# Patient Record
Sex: MALE | Race: OTHER | NOT HISPANIC OR LATINO | Employment: FULL TIME | URBAN - METROPOLITAN AREA
[De-identification: names, ages, dates, MRNs, and addresses within clinical notes are randomized per-mention and may not be internally consistent; named-entity substitution may affect disease eponyms.]

---

## 2024-11-12 ENCOUNTER — TRANSCRIBE ORDERS (OUTPATIENT)
Dept: ADMINISTRATIVE | Facility: HOSPITAL | Age: 59
End: 2024-11-12
Payer: COMMERCIAL

## 2024-11-12 DIAGNOSIS — Z00.01 ENCOUNTER FOR GENERAL ADULT MEDICAL EXAMINATION WITH ABNORMAL FINDINGS: Primary | ICD-10-CM

## 2025-03-04 ENCOUNTER — APPOINTMENT (OUTPATIENT)
Dept: CT IMAGING | Facility: HOSPITAL | Age: 60
End: 2025-03-04
Payer: COMMERCIAL

## 2025-03-04 ENCOUNTER — HOSPITAL ENCOUNTER (EMERGENCY)
Facility: HOSPITAL | Age: 60
Discharge: ANOTHER HOSPITAL | End: 2025-03-04
Attending: STUDENT IN AN ORGANIZED HEALTH CARE EDUCATION/TRAINING PROGRAM | Admitting: STUDENT IN AN ORGANIZED HEALTH CARE EDUCATION/TRAINING PROGRAM
Payer: COMMERCIAL

## 2025-03-04 ENCOUNTER — APPOINTMENT (OUTPATIENT)
Dept: GENERAL RADIOLOGY | Facility: HOSPITAL | Age: 60
End: 2025-03-04
Payer: COMMERCIAL

## 2025-03-04 VITALS
OXYGEN SATURATION: 98 % | BODY MASS INDEX: 27.3 KG/M2 | HEART RATE: 87 BPM | HEIGHT: 71 IN | WEIGHT: 195 LBS | RESPIRATION RATE: 15 BRPM | DIASTOLIC BLOOD PRESSURE: 91 MMHG | TEMPERATURE: 98 F | SYSTOLIC BLOOD PRESSURE: 126 MMHG

## 2025-03-04 DIAGNOSIS — G93.89 BRAIN MASS: Primary | ICD-10-CM

## 2025-03-04 DIAGNOSIS — G93.6 VASOGENIC BRAIN EDEMA: ICD-10-CM

## 2025-03-04 LAB
ALBUMIN SERPL-MCNC: 4.2 G/DL (ref 3.5–5.2)
ALBUMIN/GLOB SERPL: 1.4 G/DL
ALP SERPL-CCNC: 89 U/L (ref 39–117)
ALT SERPL W P-5'-P-CCNC: 14 U/L (ref 1–41)
ANION GAP SERPL CALCULATED.3IONS-SCNC: 11.7 MMOL/L (ref 5–15)
AST SERPL-CCNC: 15 U/L (ref 1–40)
BASOPHILS # BLD AUTO: 0.06 10*3/MM3 (ref 0–0.2)
BASOPHILS NFR BLD AUTO: 0.6 % (ref 0–1.5)
BILIRUB SERPL-MCNC: 0.3 MG/DL (ref 0–1.2)
BUN SERPL-MCNC: 18 MG/DL (ref 8–23)
BUN/CREAT SERPL: 22.2 (ref 7–25)
CALCIUM SPEC-SCNC: 9.8 MG/DL (ref 8.6–10.5)
CHLORIDE SERPL-SCNC: 104 MMOL/L (ref 98–107)
CO2 SERPL-SCNC: 22.3 MMOL/L (ref 22–29)
CREAT SERPL-MCNC: 0.81 MG/DL (ref 0.76–1.27)
DEPRECATED RDW RBC AUTO: 48.4 FL (ref 37–54)
EGFRCR SERPLBLD CKD-EPI 2021: 100.9 ML/MIN/1.73
EOSINOPHIL # BLD AUTO: 0.31 10*3/MM3 (ref 0–0.4)
EOSINOPHIL NFR BLD AUTO: 3.4 % (ref 0.3–6.2)
ERYTHROCYTE [DISTWIDTH] IN BLOOD BY AUTOMATED COUNT: 16.8 % (ref 12.3–15.4)
FLUAV SUBTYP SPEC NAA+PROBE: NOT DETECTED
FLUBV RNA ISLT QL NAA+PROBE: NOT DETECTED
GLOBULIN UR ELPH-MCNC: 3 GM/DL
GLUCOSE BLDC GLUCOMTR-MCNC: 96 MG/DL (ref 70–130)
GLUCOSE SERPL-MCNC: 83 MG/DL (ref 65–99)
HCT VFR BLD AUTO: 39 % (ref 37.5–51)
HGB BLD-MCNC: 13 G/DL (ref 13–17.7)
HOLD SPECIMEN: NORMAL
HOLD SPECIMEN: NORMAL
IMM GRANULOCYTES # BLD AUTO: 0.02 10*3/MM3 (ref 0–0.05)
IMM GRANULOCYTES NFR BLD AUTO: 0.2 % (ref 0–0.5)
LYMPHOCYTES # BLD AUTO: 1.19 10*3/MM3 (ref 0.7–3.1)
LYMPHOCYTES NFR BLD AUTO: 12.9 % (ref 19.6–45.3)
MAGNESIUM SERPL-MCNC: 2.1 MG/DL (ref 1.6–2.4)
MCH RBC QN AUTO: 26.9 PG (ref 26.6–33)
MCHC RBC AUTO-ENTMCNC: 33.3 G/DL (ref 31.5–35.7)
MCV RBC AUTO: 80.7 FL (ref 79–97)
MONOCYTES # BLD AUTO: 0.55 10*3/MM3 (ref 0.1–0.9)
MONOCYTES NFR BLD AUTO: 5.9 % (ref 5–12)
NEUTROPHILS NFR BLD AUTO: 7.12 10*3/MM3 (ref 1.7–7)
NEUTROPHILS NFR BLD AUTO: 77 % (ref 42.7–76)
NRBC BLD AUTO-RTO: 0 /100 WBC (ref 0–0.2)
PLATELET # BLD AUTO: 342 10*3/MM3 (ref 140–450)
PMV BLD AUTO: 10.3 FL (ref 6–12)
POTASSIUM SERPL-SCNC: 4.6 MMOL/L (ref 3.5–5.2)
PROT SERPL-MCNC: 7.2 G/DL (ref 6–8.5)
RBC # BLD AUTO: 4.83 10*6/MM3 (ref 4.14–5.8)
SARS-COV-2 RNA RESP QL NAA+PROBE: NOT DETECTED
SODIUM SERPL-SCNC: 138 MMOL/L (ref 136–145)
TROPONIN T SERPL HS-MCNC: <6 NG/L
WBC NRBC COR # BLD AUTO: 9.25 10*3/MM3 (ref 3.4–10.8)
WHOLE BLOOD HOLD COAG: NORMAL
WHOLE BLOOD HOLD SPECIMEN: NORMAL

## 2025-03-04 PROCEDURE — 25010000002 DEXAMETHASONE SODIUM PHOSPHATE 10 MG/ML SOLUTION: Performed by: STUDENT IN AN ORGANIZED HEALTH CARE EDUCATION/TRAINING PROGRAM

## 2025-03-04 PROCEDURE — 93005 ELECTROCARDIOGRAM TRACING: CPT | Performed by: STUDENT IN AN ORGANIZED HEALTH CARE EDUCATION/TRAINING PROGRAM

## 2025-03-04 PROCEDURE — 71045 X-RAY EXAM CHEST 1 VIEW: CPT

## 2025-03-04 PROCEDURE — 25010000002 KETOROLAC TROMETHAMINE PER 15 MG: Performed by: STUDENT IN AN ORGANIZED HEALTH CARE EDUCATION/TRAINING PROGRAM

## 2025-03-04 PROCEDURE — 82948 REAGENT STRIP/BLOOD GLUCOSE: CPT

## 2025-03-04 PROCEDURE — 25010000002 METOCLOPRAMIDE PER 10 MG: Performed by: STUDENT IN AN ORGANIZED HEALTH CARE EDUCATION/TRAINING PROGRAM

## 2025-03-04 PROCEDURE — 93005 ELECTROCARDIOGRAM TRACING: CPT

## 2025-03-04 PROCEDURE — 25010000002 DIPHENHYDRAMINE PER 50 MG: Performed by: STUDENT IN AN ORGANIZED HEALTH CARE EDUCATION/TRAINING PROGRAM

## 2025-03-04 PROCEDURE — 99285 EMERGENCY DEPT VISIT HI MDM: CPT | Performed by: STUDENT IN AN ORGANIZED HEALTH CARE EDUCATION/TRAINING PROGRAM

## 2025-03-04 PROCEDURE — 70450 CT HEAD/BRAIN W/O DYE: CPT

## 2025-03-04 PROCEDURE — 85025 COMPLETE CBC W/AUTO DIFF WBC: CPT | Performed by: STUDENT IN AN ORGANIZED HEALTH CARE EDUCATION/TRAINING PROGRAM

## 2025-03-04 PROCEDURE — 96375 TX/PRO/DX INJ NEW DRUG ADDON: CPT

## 2025-03-04 PROCEDURE — 99291 CRITICAL CARE FIRST HOUR: CPT

## 2025-03-04 PROCEDURE — 96374 THER/PROPH/DIAG INJ IV PUSH: CPT

## 2025-03-04 PROCEDURE — 83735 ASSAY OF MAGNESIUM: CPT | Performed by: STUDENT IN AN ORGANIZED HEALTH CARE EDUCATION/TRAINING PROGRAM

## 2025-03-04 PROCEDURE — 80053 COMPREHEN METABOLIC PANEL: CPT | Performed by: STUDENT IN AN ORGANIZED HEALTH CARE EDUCATION/TRAINING PROGRAM

## 2025-03-04 PROCEDURE — 87636 SARSCOV2 & INF A&B AMP PRB: CPT | Performed by: STUDENT IN AN ORGANIZED HEALTH CARE EDUCATION/TRAINING PROGRAM

## 2025-03-04 PROCEDURE — 84484 ASSAY OF TROPONIN QUANT: CPT | Performed by: STUDENT IN AN ORGANIZED HEALTH CARE EDUCATION/TRAINING PROGRAM

## 2025-03-04 RX ORDER — KETOROLAC TROMETHAMINE 30 MG/ML
15 INJECTION, SOLUTION INTRAMUSCULAR; INTRAVENOUS ONCE
Status: COMPLETED | OUTPATIENT
Start: 2025-03-04 | End: 2025-03-04

## 2025-03-04 RX ORDER — DIPHENHYDRAMINE HYDROCHLORIDE 50 MG/ML
25 INJECTION INTRAMUSCULAR; INTRAVENOUS ONCE
Status: COMPLETED | OUTPATIENT
Start: 2025-03-04 | End: 2025-03-04

## 2025-03-04 RX ORDER — TIRZEPATIDE 7.5 MG/.5ML
INJECTION, SOLUTION SUBCUTANEOUS
COMMUNITY
Start: 2025-02-21

## 2025-03-04 RX ORDER — AZITHROMYCIN 250 MG/1
TABLET, FILM COATED ORAL
COMMUNITY
Start: 2025-02-27

## 2025-03-04 RX ORDER — AMOXICILLIN 875 MG/1
1 TABLET, COATED ORAL EVERY 12 HOURS SCHEDULED
COMMUNITY
Start: 2025-02-27

## 2025-03-04 RX ORDER — METOCLOPRAMIDE HYDROCHLORIDE 5 MG/ML
5 INJECTION INTRAMUSCULAR; INTRAVENOUS ONCE
Status: COMPLETED | OUTPATIENT
Start: 2025-03-04 | End: 2025-03-04

## 2025-03-04 RX ORDER — DEXAMETHASONE SODIUM PHOSPHATE 10 MG/ML
10 INJECTION, SOLUTION INTRAMUSCULAR; INTRAVENOUS ONCE
Status: COMPLETED | OUTPATIENT
Start: 2025-03-04 | End: 2025-03-04

## 2025-03-04 RX ORDER — SODIUM CHLORIDE 0.9 % (FLUSH) 0.9 %
10 SYRINGE (ML) INJECTION AS NEEDED
Status: DISCONTINUED | OUTPATIENT
Start: 2025-03-04 | End: 2025-03-04 | Stop reason: HOSPADM

## 2025-03-04 RX ORDER — DEXTROMETHORPHAN HYDROBROMIDE AND PROMETHAZINE HYDROCHLORIDE 15; 6.25 MG/5ML; MG/5ML
SYRUP ORAL
COMMUNITY
Start: 2025-02-27

## 2025-03-04 RX ADMIN — DIPHENHYDRAMINE HYDROCHLORIDE 25 MG: 50 INJECTION, SOLUTION INTRAMUSCULAR; INTRAVENOUS at 16:46

## 2025-03-04 RX ADMIN — METOCLOPRAMIDE 5 MG: 5 INJECTION, SOLUTION INTRAMUSCULAR; INTRAVENOUS at 16:46

## 2025-03-04 RX ADMIN — KETOROLAC TROMETHAMINE 15 MG: 30 INJECTION, SOLUTION INTRAMUSCULAR; INTRAVENOUS at 16:46

## 2025-03-04 RX ADMIN — DEXAMETHASONE SODIUM PHOSPHATE 10 MG: 10 INJECTION INTRAMUSCULAR; INTRAVENOUS at 19:30

## 2025-03-04 NOTE — ED PROVIDER NOTES
[facility]  Emergency Department Encounter  Emergency Medicine Physician Note       Pt Name: David Ness  MRN: [id number]  Pt :   1965  Room Number:    Date of encounter:  3/4/2025  PCP: Hayley Fernando APRN  ED Physician: Mayank Larson DO    HPI:  David Ness is a 59 y.o. male who presents to the ED for medical evaluation.  Patient works as a .  States he has been experiencing flulike symptoms for 1 week.  Complains of generalized headache, sinus congestion, fever, chills, cough.  Tested negative for COVID and flu.  Started on Augmentin by PCP.  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  Canceled class and this prompted ED evaluation.    Pertinent past medical history: Type 2 diabetes, asthma.    PAST MEDICAL HISTORY  Past Medical History:   Diagnosis Date    Asthma      Current Outpatient Medications   Medication Instructions    albuterol sulfate  (90 Base) MCG/ACT inhaler 2 puffs, Inhalation, Every 6 Hours PRN    amoxicillin (AMOXIL) 875 MG tablet 1 tablet, Every 12 Hours Scheduled    azithromycin (ZITHROMAX) 250 MG tablet TAKE 2 TABLETS BY MOUTH TODAY, THEN TAKE 1 TABLET DAILY FOR 4 DAYS AS DIRECTED    Mounjaro 7.5 MG/0.5ML solution auto-injector INJECT 7.5 MG SUBCUTANEOUSLY ONCE WEEKLY AS DIRECTED    promethazine-dextromethorphan (PROMETHAZINE-DM) 6.25-15 MG/5ML syrup TAKE 5 MILLILITERS AS NEEDED BY MOUTH EVERY 6 HOURS FOR 10 DAYS      PAST SURGICAL HISTORY  Past Surgical History:   Procedure Laterality Date    CHOLECYSTECTOMY         FAMILY HISTORY  Family History   Problem Relation Age of Onset    No Known Problems Mother     No Known Problems Father        SOCIAL HISTORY  Social History     Socioeconomic History    Marital status:    Tobacco Use    Smoking status: Every Day    Smokeless tobacco: Never   Substance and Sexual Activity    Alcohol use: Yes    Drug  use: Defer    Sexual activity: Defer     ALLERGIES  Patient has no known allergies.    REVIEW OF SYSTEMS  All systems reviewed and negative except for those discussed in HPI.     PHYSICAL EXAM  ED Triage Vitals [03/04/25 1547]   Temp Heart Rate Resp BP SpO2   98.3 °F (36.8 °C) 87 16 129/86 96 %      Temp src Heart Rate Source Patient Position BP Location FiO2 (%)   Oral Monitor Sitting Right arm --     I have reviewed the triage vital signs and nursing notes.    General: Alert.  Nontoxic appearance.  No acute distress.  Head: Normocephalic.  Atraumatic.  Eyes: Pupils 2 mm.  PERRLA.  EOMI.  No scleral icterus.  Cardiovascular: Regular rate and rhythm.  No murmurs.  No rubs.  2+ distal pulses bilaterally.  Respiratory: Equal breath sounds bilaterally.  No rales.  No rhonchi.  No wheezing.  GI: Abdomen is soft.  Nondistended.  Nontender to palpation.  No rebound.  No guarding.  No CVA tenderness.  MSK: No muscle atrophy.  Neurologic: Oriented x 3.  Speech intact without dysarthria or aphasia. Cranial nerves II-XII intact.  Strength 5/5 bilateral upper and lower extremities.  Sensation to touch grossly intact bilaterally.  No focal deficits.  No pronator drift.  Normal finger-to-nose test.    Skin: No erythema. No edema.  Psych: Normal mood.  Pleasant affect.     LAB RESULTS  Recent Results (from the past 24 hours)   CK    Collection Time: 03/04/25 10:20 PM    Specimen: Blood, Venous Line   Result Value Ref Range    Creatine Kinase 56 49 - 320 U/L   PREALBUMIN    Collection Time: 03/04/25 10:20 PM    Specimen: Blood, Venous Line   Result Value Ref Range    Prealbumin 18.6 (L) 20.0 - 41.0 mg/dL   PROBNP (REFERENCE)    Collection Time: 03/04/25 10:20 PM    Specimen: Blood, Venous Line   Result Value Ref Range    proBNP <50 0 - 899 pg/mL   APTT    Collection Time: 03/04/25 10:20 PM    Specimen: Blood, Venous Line   Result Value Ref Range    PTT 26 25 - 35 sec   TSH    Collection Time: 03/04/25 10:20 PM    Specimen: Blood,  Venous Line   Result Value Ref Range    TSH Pregnancy 0.79 0.40 - 4.20 uIU/mL   HEMOGLOBIN A1C    Collection Time: 03/04/25 10:20 PM    Specimen: Blood, Venous Line   Result Value Ref Range    Hemoglobin A1C 5.6 <5.7 %   CALCIUM, IONIZED    Collection Time: 03/04/25 10:20 PM    Specimen: Blood, Venous Line   Result Value Ref Range    Ionized Calcium, Arterial 5.4 (H) 4.6 - 5.3 mg/dL   PHOSPHORUS    Collection Time: 03/04/25 10:20 PM    Specimen: Blood, Venous Line   Result Value Ref Range    Phosphorus 2.5 2.5 - 4.5 mg/dL   MAGNESIUM    Collection Time: 03/04/25 10:20 PM    Specimen: Blood, Venous Line   Result Value Ref Range    Magnesium 2.2 1.9 - 2.4 mg/dL   PROTIME-INR    Collection Time: 03/04/25 10:20 PM    Specimen: Blood, Venous Line   Result Value Ref Range    Protime 13.3 12.0 - 14.3 sec    INR 1 0.9 - 1.1   CBC AND DIFFERENTIAL    Collection Time: 03/04/25 10:20 PM    Specimen: Blood, Venous Line   Result Value Ref Range    WBC 11.8 (H) 3.70 - 10.30 10*3/uL    RBC 5.08 4.60 - 6.10 10*6/uL    Hemoglobin 13.6 (L) 13.7 - 17.5 g/dL    Hematocrit 41.2 40.0 - 51.0 %    Platelets 362 155 - 369 10*3/uL    MCV 81 79 - 98 fL    MCH 26.8 26.0 - 32.0 pg    MCHC 33 30.7 - 35.5 g/dL    RDW 16.7 (H) 11.5 - 14.5 %    MPV 10.1 8.8 - 12.5 fL    nRBC 0 <=0.0 per 100 WBCs    Differential Type Automated     Neutrophil Rel % 91 %    Lymphocyte Rel % 6 %    Monocyte Rel % 1 %    Eosinophil % 1 %    Basophil Rel % 0 %    Immature Grans % 1 %    Neutrophils Absolute 10.75 (H) 1.60 - 6.10 10*3/uL    Lymphocytes Absolute 0.76 (L) 1.20 - 3.90 10*3/uL    Monocytes Absolute 0.09 (L) 0.30 - 0.90 10*3/uL    Eosinophils Absolute 0.09 0.00 - 0.50 10*3/uL    Basophils Absolute 0.05 0.00 - 0.10 10*3/uL    Immature Grans, Absolute 0.06 0.00 - 0.06 10*3/uL   T4, FREE    Collection Time: 03/04/25 10:20 PM    Specimen: Blood, Venous Line   Result Value Ref Range    Free T4 1.2 0.8 - 1.7 ng/dL   FOLATE    Collection Time: 03/04/25 10:20 PM     Specimen: Blood, Venous Line   Result Value Ref Range    Folate 10.2 >4.6 ng/mL   VITAMIN B12    Collection Time: 03/04/25 10:20 PM    Specimen: Blood, Venous Line   Result Value Ref Range    Vitamin B-12 814 210 - 1,033 pg/mL   HEPARIN ANTI XA    Collection Time: 03/04/25 10:20 PM    Specimen: Blood, Venous Line   Result Value Ref Range    Heparin Anti Xa Unfractionated <0.11 <1.00 IU/mL   POCT venous blood gas gem    Collection Time: 03/04/25 10:32 PM    Specimen: Blood, Venous Line; Venous Blood   Result Value Ref Range    pH, Venous 7.46 (H) 7.32 - 7.43    pCO2, Venous 39 (L) 40 - 55 mm Hg    pO2, Venous <30 25 - 40 mm Hg    SO2 42 (L) 65 - 80 %    Base Excess 3.7 (H) -2 - 3 mmol/L    HCO3, Venous 27.7 (H) 22 - 26 mmol/L    Hgb Other 14.4 13.7 - 17.5 g/dL    Hematocrit, Blood Gas 43 40.0 - 51.0 %    Sodium, Venous 135 (L) 136 - 145 mmol/L    Potassium, Venous 4.9 3.6 - 4.9 mmol/L    Chloride, Venous  107 97 - 107 mmol/L    Glucose 118 (H) 74 - 99 mg/dL    Ionized Calcium, Arterial 5.1 4.6 - 5.1 mg/dL    Lactate, Venous 1.3 0.5 - 2.2 mmol/L    Temperature 37 Celsius    pH Temp corrected, Unknown Source  7.46 (H) 7.32 - 7.43    pCO2, Temperature Corrected 39 (L) 40 - 55 mm Hg    POC-'S ID Devante Zhang     Notified Who KOURTNEY RN     Time 1 2,233     Called To Y    CBC AND DIFFERENTIAL    Collection Time: 03/05/25  2:43 AM    Specimen: Blood, Venous Line   Result Value Ref Range    WBC 8.06 3.70 - 10.30 10*3/uL    RBC 4.92 4.60 - 6.10 10*6/uL    Hemoglobin 13.1 (L) 13.7 - 17.5 g/dL    Hematocrit 39.4 (L) 40.0 - 51.0 %    Platelets 357 155 - 369 10*3/uL    MCV 80 79 - 98 fL    MCH 26.6 26.0 - 32.0 pg    MCHC 33.2 30.7 - 35.5 g/dL    RDW 16.8 (H) 11.5 - 14.5 %    MPV 10.1 8.8 - 12.5 fL    nRBC 0 <=0.0 per 100 WBCs    Differential Type Automated     Neutrophil Rel % 94 %    Lymphocyte Rel % 6 %    Monocyte Rel % 0 %    Eosinophil % 0 %    Basophil Rel % 0 %    Immature Grans % 0 %    Neutrophils Absolute  7.47 (H) 1.60 - 6.10 10*3/uL    Lymphocytes Absolute 0.51 (L) 1.20 - 3.90 10*3/uL    Monocytes Absolute 0.03 (L) 0.30 - 0.90 10*3/uL    Eosinophils Absolute 0 0.00 - 0.50 10*3/uL    Basophils Absolute 0.02 0.00 - 0.10 10*3/uL    Immature Grans, Absolute 0.03 0.00 - 0.06 10*3/uL   APTT    Collection Time: 03/05/25  2:43 AM    Specimen: Blood, Venous Line   Result Value Ref Range    PTT 27 25 - 35 sec   PROTIME-INR    Collection Time: 03/05/25  2:43 AM    Specimen: Blood, Venous Line   Result Value Ref Range    Protime 14.3 12.0 - 14.3 sec    INR 1.1 0.9 - 1.1   MRSA Screen, PCR (Inpatient) - ,    Collection Time: 03/05/25  5:12 AM    Specimen: Swab    Specimen type and source: Swab, Both anterior nares (body structure)   Result Value Ref Range    MRSA PCR Not Detected Not Detected   POCT glucose meter    Collection Time: 03/05/25  5:43 AM    Specimen: Blood    Specimen type and source: Blood, Capillary blood specimen (specimen)   Result Value Ref Range    Glucose 220 (H) 74 - 99 mg/dL    Comment      POC-'S ID Marlen, Ashtyn     Device [id number]     BG Specimen Type Capillary    POCT glucose meter    Collection Time: 03/05/25  5:52 AM    Specimen: Blood    Specimen type and source: Blood, Capillary blood specimen (specimen)   Result Value Ref Range    Glucose 217 (H) 74 - 99 mg/dL    Comment      POC-'S ID Ruth Ann Galindo     Device [phone]     BG Specimen Type Capillary    POCT glucose meter    Collection Time: 03/05/25  7:34 AM    Specimen: Blood    Specimen type and source: Blood, Capillary blood specimen (specimen)   Result Value Ref Range    Glucose 213 (H) 74 - 99 mg/dL    Comment      POC-'S ID Telma Goldberg     Device [phone]     BG Specimen Type Capillary    Blood Gas, Arterial -    Collection Time: 03/05/25  9:51 AM    Specimen: Blood, Arterial Line   Result Value Ref Range    pH, Arterial 7.39 7.31 - 7.42    pCO2, Arterial 36 32 - 45 mmHg    pO2 Arterial 353 >80  mmHg    O2  (H) 94 - 98 %    Base Excess -3.2 (L) -2.0 - 3.0 mmol/L    HCO3, Arterial 21 (L) 22 - 26 mmol/L    Hematocrit, Blood Gas 37.3 (L) 40.0 - 51.0 %    Sodium 135 (L) 136 - 145 mmol/L    Potassium 4.4 3.6 - 4.9 mmol/L    Chloride 106 97 - 107 mmol/L    Glucose 167 (H) 74 - 99 mg/dL    Ionized Calcium, Arterial 4.9 4.6 - 5.1 mg/dL    Lactate 0.8 0.5 - 1.6 mmol/L       RADIOLOGY  MRI Cyberknife BRAIN W WO Contrast    Result Date: 3/5/2025  CLINICAL INDICATION: preop 59-year-old male patient with a right temporoparietal measuring approximately 60 mm transverse by 55 mm AP by 16 mm in craniocaudal dimensions mass. TECHNIQUE: Multiplanar multiecho sequences were performed through the brain utilizing T1 and T2 weighting, as well as either axial susceptibility weighted or gradient echo sequences, and axial diffusion weighted images. A coronal post-contrast 1mm thick T1-weighted 3D MP RAGE sequence was performed and multiplanar reformatted images were created. Imaging was performed with and without contrast administration: 9 mL of Gadavist. COMPARISON: Outside CT head dated 3/4/2025. FINDINGS: Diagnostic Quality: The images are somewhat degraded due to motion artifact, status post the postcontrast images. There is a nonspecific, heterogenous, infiltrating, destructive mass in the right parieto-occipital region measuring approximately 59 mm transverse by 55 mm AP by 60 mm in craniocaudal dimensions. It demonstrates intense enhancement on postcontrast images and demonstrates areas of susceptibility artifact, T1 shortening and fluid- fluid levels within, compatible with intralesional hemorrhage. There are areas of diffusion restriction within the mass which demonstrate enhancement on postcontrast images and could possibly represent hypercellularity within the mass. The mass appears to invade the posterior most aspect of the superior sagittal sinus and the torcula and extends into the calvarium in the occipital  region (series 17, images 12, 13; axial series 1017, image is 104 through 130). It also appears to extend across the posterior interhemispheric falx into the left occipital region causing indentation of the underlying brain parenchyma (series 7 and 10, images 12 through 15). There is  associated dural thickening and enhancement in the right parieto-occipital region. The mass displaces the right splenium of corpus callosum and partially effaces the posterior body, atrium and the temporal horn of the right lateral ventricle. There is mild surrounding T2 and FLAIR hyperintense signal suggestive of edema. A nonspecific, ill-defined area of thick sulcal enhancement is seen in the right lateral frontal parietal region (series 1007, images 74) measuring approximately 9 mm x 4 mm in size. The mass effect on the ventricular system is described above. The sulci in the right parieto-occipital and posterior temporal region demonstrate FLAIR hyperintense signal within, possibly due to the hemorrhage associated with the mass. The rest of the sulcal spaces appear unremarkable. The basal cisterns are within normal limits. No abnormal extra-axial fluid collection is seen. No obvious signal abnormality, diffusion restriction,  susceptibility artifact or abnormal enhancement is seen in the rest of the brain parenchyma. There is no midline shift. Vascular Flow Voids: Normal. Paranasal Sinuses and Mastoid Air Cells: Minimal mucosal thickening is seen in a few scattered ethmoid air cells and the frontal sinuses. The rest of the paranasal sinuses are clear. Bilateral mastoid air cells are clear. Orbits: No definite masses within the limitations of the study. Extracranial Findings: None. Craniocervical Junction and Skull Base: No tonsillar ectopia or mass is present.    1. Nonspecific, enhancing, heterogenous, 60 mm x 55 mm x 59 mm mass in the right parietal occipital region with areas of hemorrhage within, invading the posterior aspect of  the superior sagittal sinus and the torcula, invades the calvarium in the occipital region and extending across midline causing indentation of the left occipital lobe, with associated mass effect on the ventricular system and the brain parenchyma as described above. The differential considerations include metastasis versus glioblastoma. 2. 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 3. No other enhancing focus or signal abnormality is seen in the rest of the brain parenchyma. CRITICAL RESULT: No. COMMUNICATION: Per this written report. These findings were discussed via secure chat with Lance Pandey M.D. and Adolfo Nguyen M.D. on 3/5/2025 8:04 AM by Caitie Yanes MD with acknowledgment of the results. Drafted by Caitie Yanes MD on 3/5/2025 7:52 AM Final report signed by Caitie Yanes MD on 3/5/2025 8:22 AM    CT Abdomen Pelvis With Contrast    Result Date: 3/5/2025  CLINICAL INDICATION: Metastatic disease evaluation TECHNIQUE: Imaging of the abdomen and pelvis was performed, from lung bases through pubic symphysis, using spiral technique, following administration of IV contrast, Isovue 370, 100 mL. Reformatted images in the coronal and sagittal planes were generated from the axial data set to facilitate diagnostic accuracy. Total DLP (Dose-Length Product): 782.04 mGy.cm mGy*cm. Please note: The reported value represents the total of one or more individual components during the CT acquisition on this date and at this time, and as such, the same value may appear in more than one CT report depending on the interpreting/reporting physicians. COMPARISON: None. FINDINGS: Body Wall: Normal. Bones: No acute fracture. Kidneys: No hydronephrosis. Bilateral renal cysts. No renal calculus. Lung Bases: The lung bases are clear. Liver/Gallbladder/Biliary system: The liver  demonstrates homogeneous enhancement. Prior cholecystectomy. No intra- or extra-hepatic biliary ductal dilatation. Spleen: The spleen enhances homogeneously. Pancreas: The pancreas enhances homogeneously. Adrenals: The adrenals are morphologically unremarkable. Bowel/Mesentery: The small and large bowel are normal in caliber.  The appendix is visualized and normal. Vessels/Lymph Nodes: The abdominal aorta is unremarkable. No lymphadenopathy within the abdomen or pelvis. Fluid Survey: No free fluid in the abdomen. Pelvis: Enlarged prostate.    No evidence of abdominal or pelvic malignancy. CRITICAL RESULT:   No. COMMUNICATION: Per this written report. Drafted by Guy Fermin MD on 3/5/2025 5:27 AM Final report signed by Guy Fermin MD on 3/5/2025 5:28 AM    CT Chest With Contrast Diagnostic    Result Date: 3/5/2025  CLINICAL INDICATION: Metastatic disease evaluation TECHNIQUE: Imaging of the chest was performed, from thoracic inlet through upper abdomen, using spiral technique, following administration of IV contrast, Isovue 370, 100 mL according to the CT Chest protocol. Total DLP (Dose-Length Product): 782.04 mGy.cm mGy*cm. Please note: The reported value represents the total of one or more individual components during the CT acquisition on this date and at this time, and as such, the same value may appear in more than one CT report depending on the interpreting/reporting physicians. COMPARISON: None. FINDINGS: Chest: Chest Wall: No chest wall hematoma or contusion. Bones: No acute fracture within the chest. Upper abdomen: Please see the separately dictated CT(A) abdomen. Mediastinum: No significant hilar or mediastinal adenopathy. Aorta/Vessels: Ectasia of the ascending aorta, measuring up to 4.3 cm. No large central filling defect within the pulmonary arteries to suggest pulmonary embolism. Pleural/Pericardial Space: No pneumothorax. No pleural effusions. No pericardial effusion. Lungs: Except for minimal  dependent atelectasis, the lungs are clear.    No evidence of intrathoracic malignancy. Ectasia of the ascending aorta. CRITICAL RESULT:   No. COMMUNICATION: Per this written report. Drafted by Guy Fermin MD on 3/5/2025 5:15 AM Final report signed by Guy Fermin MD on 3/5/2025 5:21 AM     PROCEDURES  Critical Care    Performed by: Mayank Larson DO  Authorized by: Mayank Larson DO    Comments:      CRITICAL CARE PROCEDURE NOTE  Authorized and performed by: Dr. Larson    Total critical care time: Approximately 50 minutes.    Patient was critically ill due to: Brain mass with hemorrhagic component and vasogenic edema    Interventions: IV steroids, emergent transfer to tertiary care center, close airway/mental status/hemodynamic monitoring.    Due to a high probability of clinically significant, life threatening deterioration, the patient required my highest level of preparedness to intervene emergently and I personally spent this critical care time directly and personally managing the patient.  This critical care time included obtaining a history; examining the patient; pulse oximetry; ordering and review of studies; arranging urgent treatment with development of a management plan; evaluation of patient's response to treatment; frequent reassessment; and, discussions with other providers.    This critical care time was performed to assess and manage the high probability of imminent, life-threatening deterioration that could result in multiorgan failure.  It was exclusive of separately billable procedures, treating other patients and teaching time.    Please see MDM section and the rest of the note for further information on patient assessment and interventions.        RISK STRATIFICATION    MEDICAL DECISION MAKING  59 y.o. male with past medical history listed above who presents with headache and flulike symptoms.    Vital signs within normal limits.    Based on clinical presentation and physical exam,  differential diagnosis includes, but is not limited to, viral URI, viral syndrome, influenza, COVID, pneumonia, intracranial structure abnormality.    At least 3 different tests have been ordered on this patient.    Medications administered in ED:  Medications   ketorolac (TORADOL) injection 15 mg (15 mg Intravenous Given 3/4/25 1646)   metoclopramide (REGLAN) injection 5 mg (5 mg Intravenous Given 3/4/25 1646)   diphenhydrAMINE (BENADRYL) injection 25 mg (25 mg Intravenous Given 3/4/25 1646)   dexAMETHasone sodium phosphate injection 10 mg (10 mg Intravenous Given 3/4/25 1930)     Please see ED course below for my interpretation of the ED workup.  ED Course as of 03/05/25 2109   Tue Mar 04, 2025   1611 ECG 12 Lead ED Triage Standing Order; Weak / Dizzy / AMS  EKG per my interpretation normal sinus rhythm, rate 82, normal axis, no STEMI, normal QRS QTC intervals.   [JS]   1921 CT Head Without Contrast  Radiology called CRITICAL FINDINGS: Large hemorrhagic mass involving gray and white matter in the right parietal temporal occipital region. The primary diagnostic consideration would be neoplasm. There is a intraparenchymal hemorrhagic collection measuring 3.8 cm in greatest axial diameter.     [JS]   1922 Given lack of neurosurgery services locally, patient will require transfer to tertiary care center for further workup and management.    I added IV Decadron.    On reevaluation, patient rest comfortably.  Mental status remains stable. Vital signs stable on room air.    I discussed the findings of the ED workup with the patient including my recommendation for transfer.  Patient agreeable with plan and disposition.       [JS]   1954 I reviewed the labs listed above. Clinically unremarkable.   [JS]   1954 XR Chest 1 View  I have independently reviewed and interpreted the chest x-ray.  My interpretation is negative for pneumonia.    [JS]   1955 Case discussed and patient accepted for transfer by Dr. Garza (triage  physician) at [facility].   [JS]      ED Course User Index  [JS] Mayank Larson DO     REPEAT VITAL SIGNS  AS OF 21:09 EST VITALS:  BP - 126/91  HR - 87  TEMP - 98 °F (36.7 °C)  O2 SATS - 98%    DIAGNOSIS  Final diagnoses:   Brain mass   Vasogenic brain edema     DISPOSITION  ED Disposition       ED Disposition   Transfer to Another Facility     Condition   --    Comment   --             Please note that portions of this document were completed with voice recognition software.        Mayank Larson DO  03/05/25 2109       Mayank Larson DO  03/05/25 2109